# Patient Record
Sex: MALE | Race: WHITE | NOT HISPANIC OR LATINO
[De-identification: names, ages, dates, MRNs, and addresses within clinical notes are randomized per-mention and may not be internally consistent; named-entity substitution may affect disease eponyms.]

---

## 2022-06-14 ENCOUNTER — APPOINTMENT (OUTPATIENT)
Dept: MRI IMAGING | Facility: HOSPITAL | Age: 52
End: 2022-06-14

## 2022-10-12 ENCOUNTER — EMERGENCY (EMERGENCY)
Facility: HOSPITAL | Age: 52
LOS: 1 days | Discharge: HOME | End: 2022-10-12
Admitting: EMERGENCY MEDICINE
Payer: COMMERCIAL

## 2022-10-12 VITALS
HEART RATE: 70 BPM | SYSTOLIC BLOOD PRESSURE: 122 MMHG | DIASTOLIC BLOOD PRESSURE: 80 MMHG | RESPIRATION RATE: 16 BRPM | OXYGEN SATURATION: 99 %

## 2022-10-12 VITALS
OXYGEN SATURATION: 96 % | RESPIRATION RATE: 18 BRPM | HEART RATE: 66 BPM | DIASTOLIC BLOOD PRESSURE: 79 MMHG | SYSTOLIC BLOOD PRESSURE: 129 MMHG | WEIGHT: 225.97 LBS | TEMPERATURE: 98 F
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
## 2022-10-12 DIAGNOSIS — S80.212A ABRASION, LEFT KNEE, INITIAL ENCOUNTER: ICD-10-CM

## 2022-10-12 DIAGNOSIS — S80.211A ABRASION, RIGHT KNEE, INITIAL ENCOUNTER: ICD-10-CM

## 2022-10-12 DIAGNOSIS — S50.311A ABRASION OF RIGHT ELBOW, INITIAL ENCOUNTER: ICD-10-CM
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
## 2022-10-12 DIAGNOSIS — S00.211A ABRASION OF RIGHT EYELID AND PERIOCULAR AREA, INITIAL ENCOUNTER: ICD-10-CM

## 2022-10-12 DIAGNOSIS — Z23 ENCOUNTER FOR IMMUNIZATION: ICD-10-CM

## 2022-10-12 DIAGNOSIS — S00.81XA ABRASION OF OTHER PART OF HEAD, INITIAL ENCOUNTER: ICD-10-CM

## 2022-10-12 PROCEDURE — 90715 TDAP VACCINE 7 YRS/> IM: CPT

## 2022-10-12 PROCEDURE — 70486 CT MAXILLOFACIAL W/O DYE: CPT | Mod: 26,MG

## 2022-10-12 PROCEDURE — 73080 X-RAY EXAM OF ELBOW: CPT | Mod: 26

## 2022-10-12 PROCEDURE — 70450 CT HEAD/BRAIN W/O DYE: CPT | Mod: MG

## 2022-10-12 PROCEDURE — 73080 X-RAY EXAM OF ELBOW: CPT | Mod: 26,RT

## 2022-10-12 PROCEDURE — G1004: CPT

## 2022-10-12 PROCEDURE — 99284 EMERGENCY DEPT VISIT MOD MDM: CPT | Mod: 25

## 2022-10-12 PROCEDURE — 70450 CT HEAD/BRAIN W/O DYE: CPT | Mod: 26,MG

## 2022-10-12 PROCEDURE — 73562 X-RAY EXAM OF KNEE 3: CPT

## 2022-10-12 PROCEDURE — 99285 EMERGENCY DEPT VISIT HI MDM: CPT | Mod: 25

## 2022-10-12 PROCEDURE — 90471 IMMUNIZATION ADMIN: CPT

## 2022-10-12 PROCEDURE — 73080 X-RAY EXAM OF ELBOW: CPT

## 2022-10-12 PROCEDURE — 70486 CT MAXILLOFACIAL W/O DYE: CPT | Mod: MG

## 2022-10-12 PROCEDURE — 73562 X-RAY EXAM OF KNEE 3: CPT | Mod: 26

## 2022-10-12 PROCEDURE — 73562 X-RAY EXAM OF KNEE 3: CPT | Mod: 26,50

## 2022-10-12 RX ORDER — TETANUS TOXOID, REDUCED DIPHTHERIA TOXOID AND ACELLULAR PERTUSSIS VACCINE, ADSORBED 5; 2.5; 8; 8; 2.5 [IU]/.5ML; [IU]/.5ML; UG/.5ML; UG/.5ML; UG/.5ML
0.5 SUSPENSION INTRAMUSCULAR ONCE
Refills: 0 | Status: COMPLETED | OUTPATIENT
Start: 2022-10-12 | End: 2022-10-12

## 2022-10-12 RX ADMIN — TETANUS TOXOID, REDUCED DIPHTHERIA TOXOID AND ACELLULAR PERTUSSIS VACCINE, ADSORBED 0.5 MILLILITER(S): 5; 2.5; 8; 8; 2.5 SUSPENSION INTRAMUSCULAR at 15:28
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
## 2022-10-12 NOTE — ED PROVIDER NOTE - PATIENT PORTAL LINK FT
You can access the FollowMyHealth Patient Portal offered by [facility] by registering at the following website: [email or web address]. By joining [facility]’s FollowMyHealth portal, you will also be able to view your health information using other applications (apps) compatible with our system.
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
## 2022-10-12 NOTE — ED PROVIDER NOTE - ENMT, MLM
Airway patent, +abrasions to right forehead and periorbital area on right side with +ttp, no crepitus or deformity

## 2022-10-12 NOTE — ED PROVIDER NOTE - MUSCULOSKELETAL, MLM
Spine appears normal, no midline spine tenderness.  right arm +mild ttp to posterior elbow with overlying abrasion, no deformity, +FROM.  b/l knees with +abrasions anteriorly with surrounding tenderness, no deformity, +FROM b/l
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
## 2022-10-12 NOTE — ED ADULT NURSE NOTE - NSIMPLEMENTINTERV_GEN_ALL_ED
Implemented All Fall Risk Interventions:  [place] to call system. Call bell, personal items and telephone within reach. Instruct patient to call for assistance. Room bathroom lighting operational. Non-slip footwear when patient is off stretcher. Physically safe environment: no spills, clutter or unnecessary equipment. Stretcher in lowest position, wheels locked, appropriate side rails in place. Provide visual cue, wrist band, yellow gown, etc. Monitor gait and stability. Monitor for mental status changes and reorient to person, place, and time. Review medications for side effects contributing to fall risk. Reinforce activity limits and safety measures with patient and family.

## 2022-10-12 NOTE — ED PROVIDER NOTE - TEMPLATE
Pt notified of low risk Panorama testing. She is also aware of sex. Report will be scanned under media.  
Orthopedic

## 2022-10-12 NOTE — ED PROVIDER NOTE - NSFOLLOWUPINSTRUCTIONS_ED_ALL_ED_FT
Abrasion      An abrasion is a cut or a scrape on your skin. You must take care of your wound so germs do not get in it and cause infection.      What are the causes?    This condition is caused by rubbing your skin on something or falling on a surface, such as the ground. When your skin rubs on something, some layers of skin may rub off.      What are the signs or symptoms?    •A cut or a scrape.       •Bleeding.      •A red or pink spot.      •A bruise under your wound.        How is this treated?    This condition may be treated with:  •Cleaning your wound.      •Putting ointment on your wound.      •Putting a bandage on your wound.      •Getting a tetanus shot.        Follow these instructions at home:    Your doctor may tell you to do these things:    Medicines     •Take or use over-the-counter and prescription medicines only as told by your doctor.      •If you were prescribed an antibiotic medicine, use it as told by your doctor. Do not stop using it even if you start to feel better.      Keep your wound clean   •Clean your wound 1 or 2 times a day or as often told by your doctor. To do this:  1.Wash your hands for at least 20 seconds with mild soap and water. Do this before and after you clean your wound.      2.Wash your wound with mild soap and water.      3.Rinse off the soap.      4.Pat your wound with a clean towel to dry it. Do not rub your wound.      •Keep your bandage clean and dry. Take it off and change it as told by your doctor.  •You may have to change your bandage one or more times a day, or as told by your doctor.        Watch for signs of infection     Check your wound every day for signs of infection. Check for:  •A red streak that goes away from your wound.      •Other redness.      •Swelling or more pain.      •Warmth.       •Blood, fluid, pus, or a bad smell.        Treat pain and swelling  •If told, put ice on the injured area. To do this:  •Put ice in a plastic bag.       •Place a towel between your skin and the bag.       •Leave the ice on for 20 minutes, 2–3 times a day.      •Take off the ice if your skin turns bright red. This is very important. If you cannot feel pain, heat, or cold, you have a greater risk of damage to the area.        •If you can, raise the injured area above the level of your heart while you are sitting or lying down.      General instructions    •Do not take baths, swim, or use a hot tub. Ask your doctor about taking showers or sponge baths.      •Keep all follow-up visits.        Contact a doctor if:  •You had a tetanus shot, and you have any of these where the needle went in:  •Swelling.      •Very bad pain.      •Redness.      •Bleeding.        •You have a lot of pain, and medicine does not help.      •You have a fever.    •You have any of these signs of infection in your wound:  •Redness, swelling, or more pain.      •Blood, fluid, pus, or a bad smell.      •Warmth.          Get help right away if:    •You have a red streak going away from your wound.        Summary    •An abrasion is a cut or a scrape on your skin. Take care of your wound so germs do not get in it.      •Clean your wound 1 or 2 times a day or as often as told. Change your bandage as told and use medicines as told.      •Call your doctor if you have a fever or if you have redness, swelling, or more pain in your wound.      •Call your doctor if you have blood, fluid, pus, or a bad smell in your wound.      •Get help right away if you have a red streak going away from your wound.      This information is not intended to replace advice given to you by your health care provider. Make sure you discuss any questions you have with your health care provider.